# Patient Record
Sex: MALE | Race: OTHER | NOT HISPANIC OR LATINO | URBAN - METROPOLITAN AREA
[De-identification: names, ages, dates, MRNs, and addresses within clinical notes are randomized per-mention and may not be internally consistent; named-entity substitution may affect disease eponyms.]

---

## 2018-08-24 ENCOUNTER — IMPORTED ENCOUNTER (OUTPATIENT)
Dept: URBAN - METROPOLITAN AREA CLINIC 31 | Facility: CLINIC | Age: 48
End: 2018-08-24

## 2018-08-24 PROCEDURE — 92012 INTRM OPH EXAM EST PATIENT: CPT

## 2018-08-24 NOTE — PATIENT DISCUSSION
1.  Episcleritis OD - The patient presented with pain and redness of his/her right eye. The inflammation is limited to the superficial episcleral tissue. Rx Pred Forte tid 2 weeks.   Tears prn.  2.  RTN 10 days OC

## 2018-08-27 PROBLEM — H15.101: Noted: 2018-08-27

## 2021-07-19 ENCOUNTER — IMPORTED ENCOUNTER (OUTPATIENT)
Dept: URBAN - METROPOLITAN AREA CLINIC 31 | Facility: CLINIC | Age: 51
End: 2021-07-19

## 2021-07-19 PROCEDURE — 99214 OFFICE O/P EST MOD 30 MIN: CPT

## 2021-07-19 NOTE — PATIENT DISCUSSION
Corneal abrasion OS: topical antibiotic drops/sotero prescribed. Cycloplegia in office. Importance of follow-up discussed because of risk of infection. Pain medication prn. Patient to start Pf tid os  moxifloxin qid os.  RTN 1 week OC

## 2021-07-19 NOTE — PATIENT DISCUSSION
Corneal abrasion OS: topical antibiotic drops/sotero prescribed. Cycloplegia in office. Importance of follow-up discussed because of risk of infection. Pain medication prn.

## 2021-08-02 ENCOUNTER — IMPORTED ENCOUNTER (OUTPATIENT)
Dept: URBAN - METROPOLITAN AREA CLINIC 31 | Facility: CLINIC | Age: 51
End: 2021-08-02

## 2021-08-02 PROBLEM — S05.02XA: Noted: 2021-08-02

## 2021-10-05 ENCOUNTER — IMPORTED ENCOUNTER (OUTPATIENT)
Dept: URBAN - METROPOLITAN AREA CLINIC 31 | Facility: CLINIC | Age: 51
End: 2021-10-05

## 2021-10-13 ENCOUNTER — IMPORTED ENCOUNTER (OUTPATIENT)
Dept: URBAN - METROPOLITAN AREA CLINIC 31 | Facility: CLINIC | Age: 51
End: 2021-10-13

## 2021-10-13 PROBLEM — H25.043: Noted: 2021-10-13

## 2021-10-13 PROCEDURE — 92015 DETERMINE REFRACTIVE STATE: CPT

## 2021-10-13 PROCEDURE — 92014 COMPRE OPH EXAM EST PT 1/>: CPT

## 2021-10-13 NOTE — PATIENT DISCUSSION
1. [facility] Cataract OU:   OD>OS---Explained how cataracts can effect vision. Recommend clinical observation. The patient was advised to contact us if any change or worsening of vision. 2. HYpertenisve retinopathyOU--  PT's BP has been 160/98 and getting treated-- Alot of stress with his MOM/ he broke 3 ribs3. Rx for dist and near. 4.   RTN 6 mths DF--eval cats-if worsened--tc sx

## 2022-04-02 ASSESSMENT — TONOMETRY
OS_IOP_MMHG: 13
OD_IOP_MMHG: 13
OD_IOP_MMHG: 14
OD_IOP_MMHG: 14
OS_IOP_MMHG: 14
OS_IOP_MMHG: 14

## 2022-04-02 ASSESSMENT — VISUAL ACUITY
OD_PH: 20/25
OD_CC: 20/40
OD_SC: 20/200
OS_PH: 20/25
OD_CC: 20/25-2
OS_CC: 20/25
OS_CC: 20/40
OS_CC: 20/25-2
OS_SC: 20/200
OD_CC: 20/40

## 2023-06-23 ENCOUNTER — COMPREHENSIVE EXAM (OUTPATIENT)
Dept: URBAN - METROPOLITAN AREA CLINIC 34 | Facility: CLINIC | Age: 53
End: 2023-06-23

## 2023-06-23 DIAGNOSIS — H25.043: ICD-10-CM

## 2023-06-23 DIAGNOSIS — H35.033: ICD-10-CM

## 2023-06-23 DIAGNOSIS — H53.8: ICD-10-CM

## 2023-06-23 PROCEDURE — 92015 DETERMINE REFRACTIVE STATE: CPT

## 2023-06-23 PROCEDURE — 92014 COMPRE OPH EXAM EST PT 1/>: CPT

## 2023-06-23 ASSESSMENT — VISUAL ACUITY
OD_CC: J1+
OD_CC: 20/20-2
OS_CC: J1+
OS_CC: 20/20-2

## 2023-06-23 ASSESSMENT — TONOMETRY
OS_IOP_MMHG: 16
OD_IOP_MMHG: 16

## 2023-06-26 ENCOUNTER — NEW PATIENT (OUTPATIENT)
Dept: URBAN - METROPOLITAN AREA CLINIC 33 | Facility: CLINIC | Age: 53
End: 2023-06-26

## 2023-06-26 VITALS
HEART RATE: 70 BPM | SYSTOLIC BLOOD PRESSURE: 171 MMHG | HEIGHT: 73 IN | BODY MASS INDEX: 27.83 KG/M2 | DIASTOLIC BLOOD PRESSURE: 118 MMHG | WEIGHT: 210 LBS

## 2023-06-26 DIAGNOSIS — H53.8: ICD-10-CM

## 2023-06-26 DIAGNOSIS — H25.13: ICD-10-CM

## 2023-06-26 DIAGNOSIS — H43.391: ICD-10-CM

## 2023-06-26 DIAGNOSIS — H35.033: ICD-10-CM

## 2023-06-26 PROCEDURE — 92004 COMPRE OPH EXAM NEW PT 1/>: CPT

## 2023-06-26 ASSESSMENT — VISUAL ACUITY
OD_CC: 20/25-2
OS_SC: 20/30-2
OD_SC: 20/30+2
OS_CC: 20/20-2

## 2023-06-26 ASSESSMENT — TONOMETRY
OS_IOP_MMHG: 11
OD_IOP_MMHG: 20